# Patient Record
Sex: MALE | Race: BLACK OR AFRICAN AMERICAN | NOT HISPANIC OR LATINO | URBAN - METROPOLITAN AREA
[De-identification: names, ages, dates, MRNs, and addresses within clinical notes are randomized per-mention and may not be internally consistent; named-entity substitution may affect disease eponyms.]

---

## 2019-08-31 ENCOUNTER — INPATIENT (INPATIENT)
Facility: HOSPITAL | Age: 54
LOS: 1 days | Discharge: HOME | End: 2019-09-02

## 2019-08-31 VITALS
TEMPERATURE: 98 F | RESPIRATION RATE: 18 BRPM | HEART RATE: 79 BPM | OXYGEN SATURATION: 100 % | DIASTOLIC BLOOD PRESSURE: 83 MMHG | SYSTOLIC BLOOD PRESSURE: 122 MMHG

## 2019-08-31 DIAGNOSIS — K35.890 OTHER ACUTE APPENDICITIS WITHOUT PERFORATION OR GANGRENE: ICD-10-CM

## 2019-08-31 LAB
ALBUMIN SERPL ELPH-MCNC: 4.5 G/DL — SIGNIFICANT CHANGE UP (ref 3.3–5)
ALP SERPL-CCNC: 78 U/L — SIGNIFICANT CHANGE UP (ref 40–120)
ALT FLD-CCNC: 12 U/L — SIGNIFICANT CHANGE UP (ref 4–41)
ANION GAP SERPL CALC-SCNC: 12 MMO/L — SIGNIFICANT CHANGE UP (ref 7–14)
APPEARANCE UR: CLEAR — SIGNIFICANT CHANGE UP
APTT BLD: 27.8 SEC — SIGNIFICANT CHANGE UP (ref 27.5–36.3)
AST SERPL-CCNC: 14 U/L — SIGNIFICANT CHANGE UP (ref 4–40)
BASOPHILS # BLD AUTO: 0.07 K/UL — SIGNIFICANT CHANGE UP (ref 0–0.2)
BASOPHILS NFR BLD AUTO: 0.8 % — SIGNIFICANT CHANGE UP (ref 0–2)
BILIRUB SERPL-MCNC: 0.5 MG/DL — SIGNIFICANT CHANGE UP (ref 0.2–1.2)
BILIRUB UR-MCNC: NEGATIVE — SIGNIFICANT CHANGE UP
BLD GP AB SCN SERPL QL: NEGATIVE — SIGNIFICANT CHANGE UP
BLOOD UR QL VISUAL: NEGATIVE — SIGNIFICANT CHANGE UP
BUN SERPL-MCNC: 11 MG/DL — SIGNIFICANT CHANGE UP (ref 7–23)
CALCIUM SERPL-MCNC: 10.1 MG/DL — SIGNIFICANT CHANGE UP (ref 8.4–10.5)
CHLORIDE SERPL-SCNC: 101 MMOL/L — SIGNIFICANT CHANGE UP (ref 98–107)
CO2 SERPL-SCNC: 31 MMOL/L — SIGNIFICANT CHANGE UP (ref 22–31)
COLOR SPEC: SIGNIFICANT CHANGE UP
CREAT SERPL-MCNC: 0.92 MG/DL — SIGNIFICANT CHANGE UP (ref 0.5–1.3)
EOSINOPHIL # BLD AUTO: 0.11 K/UL — SIGNIFICANT CHANGE UP (ref 0–0.5)
EOSINOPHIL NFR BLD AUTO: 1.2 % — SIGNIFICANT CHANGE UP (ref 0–6)
GLUCOSE SERPL-MCNC: 94 MG/DL — SIGNIFICANT CHANGE UP (ref 70–99)
GLUCOSE UR-MCNC: NEGATIVE — SIGNIFICANT CHANGE UP
HCT VFR BLD CALC: 42.9 % — SIGNIFICANT CHANGE UP (ref 39–50)
HGB BLD-MCNC: 13.6 G/DL — SIGNIFICANT CHANGE UP (ref 13–17)
IMM GRANULOCYTES NFR BLD AUTO: 0.6 % — SIGNIFICANT CHANGE UP (ref 0–1.5)
INR BLD: 1.1 — SIGNIFICANT CHANGE UP (ref 0.88–1.17)
KETONES UR-MCNC: NEGATIVE — SIGNIFICANT CHANGE UP
LEUKOCYTE ESTERASE UR-ACNC: NEGATIVE — SIGNIFICANT CHANGE UP
LYMPHOCYTES # BLD AUTO: 3.28 K/UL — SIGNIFICANT CHANGE UP (ref 1–3.3)
LYMPHOCYTES # BLD AUTO: 36.5 % — SIGNIFICANT CHANGE UP (ref 13–44)
MCHC RBC-ENTMCNC: 30.4 PG — SIGNIFICANT CHANGE UP (ref 27–34)
MCHC RBC-ENTMCNC: 31.7 % — LOW (ref 32–36)
MCV RBC AUTO: 95.8 FL — SIGNIFICANT CHANGE UP (ref 80–100)
MONOCYTES # BLD AUTO: 0.57 K/UL — SIGNIFICANT CHANGE UP (ref 0–0.9)
MONOCYTES NFR BLD AUTO: 6.3 % — SIGNIFICANT CHANGE UP (ref 2–14)
NEUTROPHILS # BLD AUTO: 4.9 K/UL — SIGNIFICANT CHANGE UP (ref 1.8–7.4)
NEUTROPHILS NFR BLD AUTO: 54.6 % — SIGNIFICANT CHANGE UP (ref 43–77)
NITRITE UR-MCNC: NEGATIVE — SIGNIFICANT CHANGE UP
NRBC # FLD: 0 K/UL — SIGNIFICANT CHANGE UP (ref 0–0)
PH UR: 6 — SIGNIFICANT CHANGE UP (ref 5–8)
PLATELET # BLD AUTO: 310 K/UL — SIGNIFICANT CHANGE UP (ref 150–400)
PMV BLD: 9.4 FL — SIGNIFICANT CHANGE UP (ref 7–13)
POTASSIUM SERPL-MCNC: 4.8 MMOL/L — SIGNIFICANT CHANGE UP (ref 3.5–5.3)
POTASSIUM SERPL-SCNC: 4.8 MMOL/L — SIGNIFICANT CHANGE UP (ref 3.5–5.3)
PROT SERPL-MCNC: 8.6 G/DL — HIGH (ref 6–8.3)
PROT UR-MCNC: NEGATIVE — SIGNIFICANT CHANGE UP
PROTHROM AB SERPL-ACNC: 12.3 SEC — SIGNIFICANT CHANGE UP (ref 9.8–13.1)
RBC # BLD: 4.48 M/UL — SIGNIFICANT CHANGE UP (ref 4.2–5.8)
RBC # FLD: 11.5 % — SIGNIFICANT CHANGE UP (ref 10.3–14.5)
RH IG SCN BLD-IMP: POSITIVE — SIGNIFICANT CHANGE UP
SODIUM SERPL-SCNC: 144 MMOL/L — SIGNIFICANT CHANGE UP (ref 135–145)
SP GR SPEC: 1.03 — SIGNIFICANT CHANGE UP (ref 1–1.04)
UROBILINOGEN FLD QL: NORMAL — SIGNIFICANT CHANGE UP
WBC # BLD: 8.98 K/UL — SIGNIFICANT CHANGE UP (ref 3.8–10.5)
WBC # FLD AUTO: 8.98 K/UL — SIGNIFICANT CHANGE UP (ref 3.8–10.5)

## 2019-08-31 RX ORDER — SODIUM CHLORIDE 9 MG/ML
1000 INJECTION, SOLUTION INTRAVENOUS
Refills: 0 | Status: DISCONTINUED | OUTPATIENT
Start: 2019-08-31 | End: 2019-09-02

## 2019-08-31 RX ORDER — SODIUM CHLORIDE 9 MG/ML
1000 INJECTION INTRAMUSCULAR; INTRAVENOUS; SUBCUTANEOUS
Refills: 0 | Status: DISCONTINUED | OUTPATIENT
Start: 2019-08-31 | End: 2019-08-31

## 2019-08-31 RX ORDER — PIPERACILLIN AND TAZOBACTAM 4; .5 G/20ML; G/20ML
3.38 INJECTION, POWDER, LYOPHILIZED, FOR SOLUTION INTRAVENOUS EVERY 8 HOURS
Refills: 0 | Status: DISCONTINUED | OUTPATIENT
Start: 2019-08-31 | End: 2019-09-02

## 2019-08-31 RX ORDER — SODIUM CHLORIDE 9 MG/ML
1000 INJECTION INTRAMUSCULAR; INTRAVENOUS; SUBCUTANEOUS ONCE
Refills: 0 | Status: COMPLETED | OUTPATIENT
Start: 2019-08-31 | End: 2019-08-31

## 2019-08-31 RX ORDER — PIPERACILLIN AND TAZOBACTAM 4; .5 G/20ML; G/20ML
3.38 INJECTION, POWDER, LYOPHILIZED, FOR SOLUTION INTRAVENOUS ONCE
Refills: 0 | Status: COMPLETED | OUTPATIENT
Start: 2019-08-31 | End: 2019-08-31

## 2019-08-31 RX ORDER — ACETAMINOPHEN 500 MG
1000 TABLET ORAL ONCE
Refills: 0 | Status: COMPLETED | OUTPATIENT
Start: 2019-09-01 | End: 2019-09-01

## 2019-08-31 RX ORDER — ACETAMINOPHEN 500 MG
1000 TABLET ORAL ONCE
Refills: 0 | Status: COMPLETED | OUTPATIENT
Start: 2019-08-31 | End: 2019-08-31

## 2019-08-31 RX ORDER — ENOXAPARIN SODIUM 100 MG/ML
40 INJECTION SUBCUTANEOUS DAILY
Refills: 0 | Status: DISCONTINUED | OUTPATIENT
Start: 2019-08-31 | End: 2019-09-02

## 2019-08-31 RX ADMIN — SODIUM CHLORIDE 1000 MILLILITER(S): 9 INJECTION INTRAMUSCULAR; INTRAVENOUS; SUBCUTANEOUS at 20:55

## 2019-08-31 RX ADMIN — PIPERACILLIN AND TAZOBACTAM 200 GRAM(S): 4; .5 INJECTION, POWDER, LYOPHILIZED, FOR SOLUTION INTRAVENOUS at 20:55

## 2019-08-31 NOTE — H&P ADULT - NSHPLABSRESULTS_GEN_ALL_CORE
Labs:                        13.6   8.98  )-----------( 310      ( 31 Aug 2019 18:35 )             42.9     PT/INR - ( 31 Aug 2019 18:35 )   PT: 12.3 SEC;   INR: 1.10          PTT - ( 31 Aug 2019 18:35 )  PTT:27.8 SEC      144  |  101  |  11  ----------------------------<  94  4.8   |  31  |  0.92    Ca    10.1      31 Aug 2019 18:35    TPro  8.6<H>  /  Alb  4.5  /  TBili  0.5  /  DBili  x   /  AST  14  /  ALT  12  /  AlkPhos  78      Urinalysis Basic - ( 31 Aug 2019 19:01 )    Color: LIGHT YELLOW / Appearance: CLEAR / S.035 / pH: 6.0  Gluc: NEGATIVE / Ketone: NEGATIVE  / Bili: NEGATIVE / Urobili: NORMAL   Blood: NEGATIVE / Protein: NEGATIVE / Nitrite: NEGATIVE   Leuk Esterase: NEGATIVE / RBC: x / WBC x   Sq Epi: x / Non Sq Epi: x / Bacteria: x

## 2019-08-31 NOTE — ED CLERICAL - NS ED CLERK NOTE PRE-ARRIVAL INFORMATION; ADDITIONAL PRE-ARRIVAL INFORMATION
RLQ pain- sent for CT showing appendicitis.  Arriving from radiology.  No PMH.  If admitted to surgery - Dr Simon as per Dr Jimenez.

## 2019-08-31 NOTE — H&P ADULT - NSHPPHYSICALEXAM_GEN_ALL_CORE
Physical Exam  T(C): 36.6  HR: 79 (79 - 79)  BP: 122/83 (122/83 - 122/83)  RR: 18 (18 - 18)  SpO2: 100% (100% - 100%)  Tmax: T(C): , Max: 36.6 (08-31-19 @ 16:53)    General: well developed, well nourished, NAD  Neuro: alert and oriented, no focal deficits, moves all extremities spontaneously  HEENT: NCAT, EOMI, anicteric, mucosa moist  Respiratory: airway patent, respirations unlabored  CVS: regular rate and rhythm  Abdomen: soft, TTP in RLQ, nondistended  Extremities: no edema, sensation and movement grossly intact  Skin: warm, dry, appropriate color

## 2019-08-31 NOTE — ED ADULT NURSE REASSESSMENT NOTE - NS ED NURSE REASSESS COMMENT FT1
pt alert,oriented x3. reports abd pains starting 1 wk ago. denies n,v,problems urinating. ct scan to showing possible appendicitis,referredd to ed. denies pain at present. iv access,albs sent. pt informed to remain npo .

## 2019-08-31 NOTE — H&P ADULT - ASSESSMENT
55 y/o with no PMHx presenting with RLQ pain x1 week, with outpatient CT scan consistent with acute appendicitis     - admit to A team surgery / Dr. Becker  - NPO   - IVF  - IV abx   - encourage OOB   - DVT ppx     Discussed with attending Dr. Rosita SAWANT Team surgery [id number]

## 2019-08-31 NOTE — H&P ADULT - HISTORY OF PRESENT ILLNESS
53 y/o with no PMhx presenting with abdominal pain x1 week, since last Sunday. Patient reports that he had a dull ache a week ago that worsened through out the week. Patient went to Urgent Care on Tuesday and followed-up with his PMD who sent him for outpatient CT scan. Outpatient ED scan concerning for appendicitis. Patient reports last decrease PO intake over the last week. Reports some nausea no episodes of emesis. Patient reports he has had colonoscopy in the past, and per patient it was normal. Denies fevers, chills, SOB, chest pain, diarrhea/constipation, recent illness or travel.

## 2019-08-31 NOTE — ED PROVIDER NOTE - OBJECTIVE STATEMENT
53 y/o M w/ no pertinent PMH, presents to the ED c/o RLQ pain that started on Monday, worsening Tuesday, and got CT scan done today which showed appendicitis w/ 1CM appendix, raine appendiceal, and appendolith. No pain at this point. No fever, nausea, vomiting, or anorexia.

## 2019-08-31 NOTE — ED ADULT TRIAGE NOTE - CHIEF COMPLAINT QUOTE
pt sent to ED for appendicitis.  pt has been having RLQ pain x a few days.  had a CT scan out patient which shows appendicitis.

## 2019-09-01 LAB
ANION GAP SERPL CALC-SCNC: 12 MMO/L — SIGNIFICANT CHANGE UP (ref 7–14)
BUN SERPL-MCNC: 9 MG/DL — SIGNIFICANT CHANGE UP (ref 7–23)
CALCIUM SERPL-MCNC: 8.9 MG/DL — SIGNIFICANT CHANGE UP (ref 8.4–10.5)
CHLORIDE SERPL-SCNC: 105 MMOL/L — SIGNIFICANT CHANGE UP (ref 98–107)
CO2 SERPL-SCNC: 26 MMOL/L — SIGNIFICANT CHANGE UP (ref 22–31)
CREAT SERPL-MCNC: 0.88 MG/DL — SIGNIFICANT CHANGE UP (ref 0.5–1.3)
GLUCOSE SERPL-MCNC: 73 MG/DL — SIGNIFICANT CHANGE UP (ref 70–99)
HCT VFR BLD CALC: 38.4 % — LOW (ref 39–50)
HCV AB S/CO SERPL IA: 0.74 S/CO — SIGNIFICANT CHANGE UP (ref 0–0.99)
HCV AB SERPL-IMP: SIGNIFICANT CHANGE UP
HGB BLD-MCNC: 12.3 G/DL — LOW (ref 13–17)
MAGNESIUM SERPL-MCNC: 1.9 MG/DL — SIGNIFICANT CHANGE UP (ref 1.6–2.6)
MCHC RBC-ENTMCNC: 30.8 PG — SIGNIFICANT CHANGE UP (ref 27–34)
MCHC RBC-ENTMCNC: 32 % — SIGNIFICANT CHANGE UP (ref 32–36)
MCV RBC AUTO: 96 FL — SIGNIFICANT CHANGE UP (ref 80–100)
NRBC # FLD: 0 K/UL — SIGNIFICANT CHANGE UP (ref 0–0)
PHOSPHATE SERPL-MCNC: 3 MG/DL — SIGNIFICANT CHANGE UP (ref 2.5–4.5)
PLATELET # BLD AUTO: 280 K/UL — SIGNIFICANT CHANGE UP (ref 150–400)
PMV BLD: 9.5 FL — SIGNIFICANT CHANGE UP (ref 7–13)
POTASSIUM SERPL-MCNC: 4.1 MMOL/L — SIGNIFICANT CHANGE UP (ref 3.5–5.3)
POTASSIUM SERPL-SCNC: 4.1 MMOL/L — SIGNIFICANT CHANGE UP (ref 3.5–5.3)
RBC # BLD: 4 M/UL — LOW (ref 4.2–5.8)
RBC # FLD: 11.4 % — SIGNIFICANT CHANGE UP (ref 10.3–14.5)
RH IG SCN BLD-IMP: POSITIVE — SIGNIFICANT CHANGE UP
SODIUM SERPL-SCNC: 143 MMOL/L — SIGNIFICANT CHANGE UP (ref 135–145)
WBC # BLD: 6.43 K/UL — SIGNIFICANT CHANGE UP (ref 3.8–10.5)
WBC # FLD AUTO: 6.43 K/UL — SIGNIFICANT CHANGE UP (ref 3.8–10.5)

## 2019-09-01 RX ORDER — INFLUENZA VIRUS VACCINE 15; 15; 15; 15 UG/.5ML; UG/.5ML; UG/.5ML; UG/.5ML
0.5 SUSPENSION INTRAMUSCULAR ONCE
Refills: 0 | Status: COMPLETED | OUTPATIENT
Start: 2019-09-01 | End: 2019-09-01

## 2019-09-01 RX ADMIN — PIPERACILLIN AND TAZOBACTAM 25 GRAM(S): 4; .5 INJECTION, POWDER, LYOPHILIZED, FOR SOLUTION INTRAVENOUS at 13:28

## 2019-09-01 RX ADMIN — PIPERACILLIN AND TAZOBACTAM 25 GRAM(S): 4; .5 INJECTION, POWDER, LYOPHILIZED, FOR SOLUTION INTRAVENOUS at 21:52

## 2019-09-01 RX ADMIN — PIPERACILLIN AND TAZOBACTAM 25 GRAM(S): 4; .5 INJECTION, POWDER, LYOPHILIZED, FOR SOLUTION INTRAVENOUS at 05:37

## 2019-09-01 RX ADMIN — ENOXAPARIN SODIUM 40 MILLIGRAM(S): 100 INJECTION SUBCUTANEOUS at 13:28

## 2019-09-01 NOTE — PROGRESS NOTE ADULT - ATTENDING COMMENTS
Pt seen/examined. Hx consistent with missed appendicitis. Clinically asymptomatic.    - cont IV antibx  - start clears  - plan elective appendectomy if cont to improve

## 2019-09-01 NOTE — PROGRESS NOTE ADULT - ASSESSMENT
55 y/o with no PMHx presenting with RLQ pain x1 week, with outpatient CT scan consistent with acute appendicitis     Plan:   - Diet: NPO   - IVF: LR @100cc  - IV abx: Zosyn  - encourage OOB   - DVT ppx: Lovenox    A Team Surgery   [id number]

## 2019-09-01 NOTE — PROGRESS NOTE ADULT - SUBJECTIVE AND OBJECTIVE BOX
GENERAL SURGERY DAILY PROGRESS NOTE:    Interval: No acute events overnight    S: Patient seen and examined. Reports no abdominal pain at the moment. Denies fever, chills, SOB. . - N/V. +/+ F/BM. +OOB. NPO and his only complaint is he is hungry.    O:   Exam:  Gen: NAD. Well developed, alert and cooperative.   Resp: No addition work of breathing.   Card: RR. No peripheral edema or pallor.   Abd: No masses. Soft, ND, NT.   Ext: WWP. Able to move all extremities equally.  Neuro: AA&Ox3. No focal defects.    Vital Signs Last 24 Hrs  T(C): 36.8 (01 Sep 2019 05:36), Max: 36.8 (31 Aug 2019 21:01)  T(F): 98.3 (01 Sep 2019 05:36), Max: 98.3 (31 Aug 2019 23:35)  HR: 74 (01 Sep 2019 05:36) (70 - 79)  BP: 115/69 (01 Sep 2019 05:36) (109/69 - 125/77)  BP(mean): --  RR: 18 (01 Sep 2019 05:36) (18 - 18)  SpO2: 100% (01 Sep 2019 05:36) (99% - 100%)    I&O's Detail    31 Aug 2019 07:01  -  01 Sep 2019 07:00  --------------------------------------------------------  IN:    lactated ringers.: 600 mL  Total IN: 600 mL    OUT:    Voided: 400 mL  Total OUT: 400 mL    Total NET: 200 mL          Daily Height in cm: 167.6 (01 Sep 2019 06:46)    Daily     MEDICATIONS  (STANDING):  acetaminophen  IVPB .. 1000 milliGRAM(s) IV Intermittent once  acetaminophen  IVPB .. 1000 milliGRAM(s) IV Intermittent once  enoxaparin Injectable 40 milliGRAM(s) SubCutaneous daily  influenza   Vaccine 0.5 milliLiter(s) IntraMuscular once  lactated ringers. 1000 milliLiter(s) (100 mL/Hr) IV Continuous <Continuous>  piperacillin/tazobactam IVPB.. 3.375 Gram(s) IV Intermittent every 8 hours    MEDICATIONS  (PRN):      LABS:                        13.6   8.98  )-----------( 310      ( 31 Aug 2019 18:35 )             42.9         144  |  101  |  11  ----------------------------<  94  4.8   |  31  |  0.92    Ca    10.1      31 Aug 2019 18:35    TPro  8.6<H>  /  Alb  4.5  /  TBili  0.5  /  DBili  x   /  AST  14  /  ALT  12  /  AlkPhos  78      PT/INR - ( 31 Aug 2019 18:35 )   PT: 12.3 SEC;   INR: 1.10          PTT - ( 31 Aug 2019 18:35 )  PTT:27.8 SEC  Urinalysis Basic - ( 31 Aug 2019 19:01 )    Color: LIGHT YELLOW / Appearance: CLEAR / S.035 / pH: 6.0  Gluc: NEGATIVE / Ketone: NEGATIVE  / Bili: NEGATIVE / Urobili: NORMAL   Blood: NEGATIVE / Protein: NEGATIVE / Nitrite: NEGATIVE   Leuk Esterase: NEGATIVE / RBC: x / WBC x   Sq Epi: x / Non Sq Epi: x / Bacteria: x

## 2019-09-02 VITALS
TEMPERATURE: 99 F | SYSTOLIC BLOOD PRESSURE: 115 MMHG | OXYGEN SATURATION: 99 % | DIASTOLIC BLOOD PRESSURE: 72 MMHG | RESPIRATION RATE: 15 BRPM | HEART RATE: 68 BPM

## 2019-09-02 LAB
ANION GAP SERPL CALC-SCNC: 16 MMO/L — HIGH (ref 7–14)
BUN SERPL-MCNC: 11 MG/DL — SIGNIFICANT CHANGE UP (ref 7–23)
CALCIUM SERPL-MCNC: 9.2 MG/DL — SIGNIFICANT CHANGE UP (ref 8.4–10.5)
CHLORIDE SERPL-SCNC: 101 MMOL/L — SIGNIFICANT CHANGE UP (ref 98–107)
CO2 SERPL-SCNC: 23 MMOL/L — SIGNIFICANT CHANGE UP (ref 22–31)
CREAT SERPL-MCNC: 0.96 MG/DL — SIGNIFICANT CHANGE UP (ref 0.5–1.3)
GLUCOSE SERPL-MCNC: 53 MG/DL — LOW (ref 70–99)
HCT VFR BLD CALC: 39.4 % — SIGNIFICANT CHANGE UP (ref 39–50)
HGB BLD-MCNC: 12.5 G/DL — LOW (ref 13–17)
MAGNESIUM SERPL-MCNC: 1.7 MG/DL — SIGNIFICANT CHANGE UP (ref 1.6–2.6)
MCHC RBC-ENTMCNC: 30.3 PG — SIGNIFICANT CHANGE UP (ref 27–34)
MCHC RBC-ENTMCNC: 31.7 % — LOW (ref 32–36)
MCV RBC AUTO: 95.4 FL — SIGNIFICANT CHANGE UP (ref 80–100)
NRBC # FLD: 0 K/UL — SIGNIFICANT CHANGE UP (ref 0–0)
PHOSPHATE SERPL-MCNC: 3 MG/DL — SIGNIFICANT CHANGE UP (ref 2.5–4.5)
PLATELET # BLD AUTO: 295 K/UL — SIGNIFICANT CHANGE UP (ref 150–400)
PMV BLD: 10.2 FL — SIGNIFICANT CHANGE UP (ref 7–13)
POTASSIUM SERPL-MCNC: 3.9 MMOL/L — SIGNIFICANT CHANGE UP (ref 3.5–5.3)
POTASSIUM SERPL-SCNC: 3.9 MMOL/L — SIGNIFICANT CHANGE UP (ref 3.5–5.3)
RBC # BLD: 4.13 M/UL — LOW (ref 4.2–5.8)
RBC # FLD: 11.3 % — SIGNIFICANT CHANGE UP (ref 10.3–14.5)
SODIUM SERPL-SCNC: 140 MMOL/L — SIGNIFICANT CHANGE UP (ref 135–145)
WBC # BLD: 8.13 K/UL — SIGNIFICANT CHANGE UP (ref 3.8–10.5)
WBC # FLD AUTO: 8.13 K/UL — SIGNIFICANT CHANGE UP (ref 3.8–10.5)

## 2019-09-02 RX ORDER — ACETAMINOPHEN 500 MG
100 TABLET ORAL
Qty: 0 | Refills: 0 | DISCHARGE
Start: 2019-09-02

## 2019-09-02 RX ORDER — MAGNESIUM SULFATE 500 MG/ML
2 VIAL (ML) INJECTION ONCE
Refills: 0 | Status: COMPLETED | OUTPATIENT
Start: 2019-09-02 | End: 2019-09-02

## 2019-09-02 RX ORDER — POTASSIUM CHLORIDE 20 MEQ
20 PACKET (EA) ORAL ONCE
Refills: 0 | Status: COMPLETED | OUTPATIENT
Start: 2019-09-02 | End: 2019-09-02

## 2019-09-02 RX ADMIN — Medication 50 GRAM(S): at 09:36

## 2019-09-02 RX ADMIN — PIPERACILLIN AND TAZOBACTAM 25 GRAM(S): 4; .5 INJECTION, POWDER, LYOPHILIZED, FOR SOLUTION INTRAVENOUS at 14:16

## 2019-09-02 RX ADMIN — Medication 20 MILLIEQUIVALENT(S): at 09:36

## 2019-09-02 RX ADMIN — PIPERACILLIN AND TAZOBACTAM 25 GRAM(S): 4; .5 INJECTION, POWDER, LYOPHILIZED, FOR SOLUTION INTRAVENOUS at 05:15

## 2019-09-02 NOTE — PROGRESS NOTE ADULT - SUBJECTIVE AND OBJECTIVE BOX
Interval Events:    No acute events overnight    S: Patient doing well, denies fevers, chills, nausea, emesis, chest pain, SOB.  Pain is well controlled. Tolerating PO w/o N/V.  +/+ F/BM.    O: Vital Signs Last 24 Hrs  T(C): 36.8 (02 Sep 2019 05:14), Max: 37.2 (01 Sep 2019 13:29)  T(F): 98.2 (02 Sep 2019 05:14), Max: 99 (01 Sep 2019 13:29)  HR: 72 (02 Sep 2019 05:14) (53 - 77)  BP: 104/60 (02 Sep 2019 05:14) (94/63 - 123/72)  BP(mean): --  RR: 16 (02 Sep 2019 05:14) (16 - 17)  SpO2: 100% (02 Sep 2019 05:14) (100% - 100%)      31 Aug 2019 07:01  -  01 Sep 2019 07:00  --------------------------------------------------------  IN:    lactated ringers.: 600 mL  Total IN: 600 mL    OUT:    Voided: 400 mL  Total OUT: 400 mL    Total NET: 200 mL      01 Sep 2019 07:01  -  02 Sep 2019 06:39  --------------------------------------------------------  IN:    IV PiggyBack: 200 mL    lactated ringers.: 1650 mL  Total IN: 1850 mL    OUT:    Voided: 1900 mL  Total OUT: 1900 mL    Total NET: -50 mL          Physical Exam:    Gen: Well-developed, well-nourished in no acute distress  Resp: Clear to auscultation bilaterally with no wheezes, rale, or rhonchi  CV: Regular rate and rhythm with no murmur, gallop, or rub  GI: Soft, non-tender, non-distended with normoactive bowel sounds.  No masses.  MSK: Moves all extremities equally  Skin: No rashes    Labs:                        12.3   6.43  )-----------( 280      ( 01 Sep 2019 06:30 )             38.4     01 Sep 2019 06:30    143    |  105    |  9      ----------------------------<  73     4.1     |  26     |  0.88     Ca    8.9        01 Sep 2019 06:30  Phos  3.0       01 Sep 2019 06:30  Mg     1.9       01 Sep 2019 06:30    TPro  8.6    /  Alb  4.5    /  TBili  0.5    /  DBili  x      /  AST  14     /  ALT  12     /  AlkPhos  78     31 Aug 2019 18:35    PT/INR - ( 31 Aug 2019 18:35 )   PT: 12.3 SEC;   INR: 1.10          PTT - ( 31 Aug 2019 18:35 )  PTT:27.8 SEC  CAPILLARY BLOOD GLUCOSE            LIVER FUNCTIONS - ( 31 Aug 2019 18:35 )  Alb: 4.5 g/dL / Pro: 8.6 g/dL / ALK PHOS: 78 u/L / ALT: 12 u/L / AST: 14 u/L / GGT: x             Urinalysis Basic - ( 31 Aug 2019 19:01 )    Color: LIGHT YELLOW / Appearance: CLEAR / S.035 / pH: 6.0  Gluc: NEGATIVE / Ketone: NEGATIVE  / Bili: NEGATIVE / Urobili: NORMAL   Blood: NEGATIVE / Protein: NEGATIVE / Nitrite: NEGATIVE   Leuk Esterase: NEGATIVE / RBC: x / WBC x   Sq Epi: x / Non Sq Epi: x / Bacteria: x        MEDICATIONS  (STANDING):  acetaminophen  IVPB .. 1000 milliGRAM(s) IV Intermittent once  enoxaparin Injectable 40 milliGRAM(s) SubCutaneous daily  influenza   Vaccine 0.5 milliLiter(s) IntraMuscular once  lactated ringers. 1000 milliLiter(s) (75 mL/Hr) IV Continuous <Continuous>  piperacillin/tazobactam IVPB.. 3.375 Gram(s) IV Intermittent every 8 hours    MEDICATIONS  (PRN):

## 2019-09-02 NOTE — DISCHARGE NOTE PROVIDER - NSDCCPCAREPLAN_GEN_ALL_CORE_FT
PRINCIPAL DISCHARGE DIAGNOSIS  Diagnosis: Other acute appendicitis  Assessment and Plan of Treatment:

## 2019-09-02 NOTE — DISCHARGE NOTE PROVIDER - HOSPITAL COURSE
Mr. Villegas is a 54yom with no PMH who presented with 1 week history of abdominal pain on 8/31. Outpatient CT scan was consistent with acute appendicitis.  He was made NPO and started on IV antibiotics.  His pain subsequently improved.  On 9/1 his pain improved, and he was almost entirely non-tender on exam; he was started on a clear liquid diet.  On 9/2 he tolerated a diet and he subjectively felt his pain had improved even more.  It was decided to discharge the patient on a 10 day course of augmentin, with follow-up for an appendectomy later in September.

## 2019-09-02 NOTE — PROGRESS NOTE ADULT - ASSESSMENT
53 y/o with no PMHx presenting with RLQ pain x1 week, with outpatient CT scan consistent with acute appendicitis     Plan:   - Diet:  regular  - Zosyn  - encourage OOB   - DVT ppx: Lovenox    Will begin dispo planning    A Team Surgery   [id number]

## 2019-09-02 NOTE — DISCHARGE NOTE NURSING/CASE MANAGEMENT/SOCIAL WORK - PATIENT PORTAL LINK FT
You can access the FollowMyHealth Patient Portal offered by [facility] by registering at the following website: [email or web address]. By joining [employer]’s FollowMyHealth portal, you will also be able to view your health information using other applications (apps) compatible with our system.

## 2019-09-02 NOTE — DISCHARGE NOTE PROVIDER - CARE PROVIDER_API CALL
Isidra Becker (MD)  ColonRectal Surgery; Surgery  [address] [facility] [address], [address]  [place] [address]  Phone: [phone]  Fax: [phone]  Follow Up Time:

## 2019-10-07 ENCOUNTER — OUTPATIENT (OUTPATIENT)
Dept: OUTPATIENT SERVICES | Facility: HOSPITAL | Age: 54
LOS: 1 days | End: 2019-10-07
Payer: COMMERCIAL

## 2019-10-07 VITALS
HEIGHT: 65.75 IN | HEART RATE: 80 BPM | DIASTOLIC BLOOD PRESSURE: 60 MMHG | RESPIRATION RATE: 14 BRPM | TEMPERATURE: 98 F | SYSTOLIC BLOOD PRESSURE: 112 MMHG | WEIGHT: 126.99 LBS

## 2019-10-07 DIAGNOSIS — K36 OTHER APPENDICITIS: ICD-10-CM

## 2019-10-07 DIAGNOSIS — K37 UNSPECIFIED APPENDICITIS: ICD-10-CM

## 2019-10-07 LAB
ANION GAP SERPL CALC-SCNC: 13 MMO/L — SIGNIFICANT CHANGE UP (ref 7–14)
BUN SERPL-MCNC: 16 MG/DL — SIGNIFICANT CHANGE UP (ref 7–23)
CALCIUM SERPL-MCNC: 9.7 MG/DL — SIGNIFICANT CHANGE UP (ref 8.4–10.5)
CHLORIDE SERPL-SCNC: 102 MMOL/L — SIGNIFICANT CHANGE UP (ref 98–107)
CO2 SERPL-SCNC: 27 MMOL/L — SIGNIFICANT CHANGE UP (ref 22–31)
CREAT SERPL-MCNC: 0.79 MG/DL — SIGNIFICANT CHANGE UP (ref 0.5–1.3)
GLUCOSE SERPL-MCNC: 77 MG/DL — SIGNIFICANT CHANGE UP (ref 70–99)
HCT VFR BLD CALC: 45 % — SIGNIFICANT CHANGE UP (ref 39–50)
HGB BLD-MCNC: 13.8 G/DL — SIGNIFICANT CHANGE UP (ref 13–17)
HIV COMBO RESULT: SIGNIFICANT CHANGE UP
HIV1+2 AB SPEC QL: SIGNIFICANT CHANGE UP
MCHC RBC-ENTMCNC: 30 PG — SIGNIFICANT CHANGE UP (ref 27–34)
MCHC RBC-ENTMCNC: 30.7 % — LOW (ref 32–36)
MCV RBC AUTO: 97.8 FL — SIGNIFICANT CHANGE UP (ref 80–100)
NRBC # FLD: 0 K/UL — SIGNIFICANT CHANGE UP (ref 0–0)
PLATELET # BLD AUTO: 200 K/UL — SIGNIFICANT CHANGE UP (ref 150–400)
PMV BLD: 11.2 FL — SIGNIFICANT CHANGE UP (ref 7–13)
POTASSIUM SERPL-MCNC: 3.7 MMOL/L — SIGNIFICANT CHANGE UP (ref 3.5–5.3)
POTASSIUM SERPL-SCNC: 3.7 MMOL/L — SIGNIFICANT CHANGE UP (ref 3.5–5.3)
RBC # BLD: 4.6 M/UL — SIGNIFICANT CHANGE UP (ref 4.2–5.8)
RBC # FLD: 12.2 % — SIGNIFICANT CHANGE UP (ref 10.3–14.5)
SODIUM SERPL-SCNC: 142 MMOL/L — SIGNIFICANT CHANGE UP (ref 135–145)
WBC # BLD: 6.99 K/UL — SIGNIFICANT CHANGE UP (ref 3.8–10.5)
WBC # FLD AUTO: 6.99 K/UL — SIGNIFICANT CHANGE UP (ref 3.8–10.5)

## 2019-10-07 PROCEDURE — 93010 ELECTROCARDIOGRAM REPORT: CPT

## 2019-10-07 NOTE — H&P PST ADULT - NSICDXPROBLEM_GEN_ALL_CORE_FT
PROBLEM DIAGNOSES  Problem: Appendicitis  Assessment and Plan: Pt. is scheduled for a laparoscopic appendectomy 10/14/19.  Pt. verbalized understanding of instructions as discussed and outlined on the instruction sheets.  Pt. did teach back on Chlorhexidine wash.

## 2019-10-14 ENCOUNTER — OUTPATIENT (OUTPATIENT)
Dept: OUTPATIENT SERVICES | Facility: HOSPITAL | Age: 54
LOS: 1 days | Discharge: HOME | End: 2019-10-14
Payer: COMMERCIAL

## 2019-10-14 VITALS
TEMPERATURE: 99 F | RESPIRATION RATE: 14 BRPM | DIASTOLIC BLOOD PRESSURE: 68 MMHG | OXYGEN SATURATION: 96 % | HEART RATE: 79 BPM | SYSTOLIC BLOOD PRESSURE: 111 MMHG

## 2019-10-14 VITALS
SYSTOLIC BLOOD PRESSURE: 118 MMHG | HEART RATE: 87 BPM | WEIGHT: 126.99 LBS | RESPIRATION RATE: 15 BRPM | OXYGEN SATURATION: 100 % | TEMPERATURE: 98 F | HEIGHT: 65.75 IN | DIASTOLIC BLOOD PRESSURE: 76 MMHG

## 2019-10-14 DIAGNOSIS — K36 OTHER APPENDICITIS: ICD-10-CM

## 2019-10-14 PROCEDURE — 88304 TISSUE EXAM BY PATHOLOGIST: CPT | Mod: 26

## 2019-10-14 RX ORDER — L.ACIDOPH/B.ANIMALIS/B.LONGUM 15B CELL
1 CAPSULE ORAL
Qty: 0 | Refills: 0 | DISCHARGE

## 2019-10-14 RX ORDER — FENTANYL CITRATE 50 UG/ML
50 INJECTION INTRAVENOUS ONCE
Refills: 0 | Status: DISCONTINUED | OUTPATIENT
Start: 2019-10-14 | End: 2019-10-14

## 2019-10-14 RX ORDER — SODIUM CHLORIDE 9 MG/ML
1000 INJECTION, SOLUTION INTRAVENOUS
Refills: 0 | Status: DISCONTINUED | OUTPATIENT
Start: 2019-10-14 | End: 2019-10-14

## 2019-10-14 RX ORDER — SODIUM CHLORIDE 9 MG/ML
1000 INJECTION, SOLUTION INTRAVENOUS
Refills: 0 | Status: DISCONTINUED | OUTPATIENT
Start: 2019-10-14 | End: 2019-11-17

## 2019-10-14 RX ORDER — OXYCODONE HYDROCHLORIDE 5 MG/1
1 TABLET ORAL
Qty: 15 | Refills: 0
Start: 2019-10-14

## 2019-10-14 RX ORDER — FENTANYL CITRATE 50 UG/ML
25 INJECTION INTRAVENOUS
Refills: 0 | Status: DISCONTINUED | OUTPATIENT
Start: 2019-10-14 | End: 2019-10-14

## 2019-10-14 RX ORDER — ONDANSETRON 8 MG/1
4 TABLET, FILM COATED ORAL ONCE
Refills: 0 | Status: DISCONTINUED | OUTPATIENT
Start: 2019-10-14 | End: 2019-11-17

## 2019-10-14 RX ORDER — CHOLECALCIFEROL (VITAMIN D3) 125 MCG
4 CAPSULE ORAL
Qty: 0 | Refills: 0 | DISCHARGE

## 2019-10-14 NOTE — ASU DISCHARGE PLAN (ADULT/PEDIATRIC) - ASU DC SPECIAL INSTRUCTIONSFT
There are small white bandaids over your incisions. They will either fall off on their own or be taken off in the office. Please allow water and soap to run over your incisions and pat them dry. Do not scrub the incisions.     Follow up in 1-2 weeks with Dr. Becker.

## 2019-10-14 NOTE — ASU DISCHARGE PLAN (ADULT/PEDIATRIC) - CALL YOUR DOCTOR IF YOU HAVE ANY OF THE FOLLOWING:
Bleeding that does not stop/Excessive diarrhea/Inability to tolerate liquids or foods/Wound/Surgical Site with redness, or foul smelling discharge or pus/Nausea and vomiting that does not stop/Unable to urinate Inability to tolerate liquids or foods/Wound/Surgical Site with redness, or foul smelling discharge or pus/Excessive diarrhea/Fever greater than (need to indicate Fahrenheit or Celsius)/Nausea and vomiting that does not stop/Unable to urinate/Bleeding that does not stop

## 2019-10-14 NOTE — ASU DISCHARGE PLAN (ADULT/PEDIATRIC) - CARE PROVIDER_API CALL
Isidra Becker (MD)  ColonRectal Surgery; Surgery  [address] [facility] [address]  [place] [address]  Phone: [phone]  Fax: [phone]  Follow Up Time:

## 2019-10-14 NOTE — ASU DISCHARGE PLAN (ADULT/PEDIATRIC) - NURSING INSTRUCTIONS
You were given 1000mg IV Tylenol for pain management.  Please DO NOT take any Tylenol containing products, such as  Vicodin, Percocet, Excedrin, many cold preparations for the next 6 hours (until 215p).  DO NOT EXCEED 3000MG OF TYLENOL OVER 24 HOURS.   You were given Toradol for pain management. Please DO Not take Motrin/Ibuprofen/Advil/Aleve (NSAIDS) for the next 6 hours (Until 230p)

## 2019-10-17 LAB — SURGICAL PATHOLOGY STUDY: SIGNIFICANT CHANGE UP

## 2021-01-27 NOTE — BRIEF OPERATIVE NOTE - SPECIMENS
· Continue hydroxychloroquine 200 mg.  Take 1 tablet twice daily.  · Increase methotrexate 2.5 mg.  Take8 tablets once a week.   · Continue folic acid 1 mg.  Take 1 tablet daily  · Start omeprazole for stomach protection.    Blood tests (CBC, AST, ALT , ESR) in 1 month, then every 3 months to monitor for bone marrow and liver toxicity due to methotrexate    Methotrexate can increase the risk of infections. One should call us if any infection develops.  Stop using methotrexate if you have signs if infection such as fever, chills, sore throat, flu like symptoms, sores or white patches in your mouth, night sweats, stomach pain, diarrhea, weight loss, skin redness and swelling, cough or chest pain.   Methotrexate may increase cancer risk which may include blood disorders like leukemia. Follow up with primary care doctor for cancer monitoring.  Methotrexate can cause inflammation of the lungs.   All patients taking methotrexate should be taking a daily folic acid.  Avoid alcohol.    Please be aware that this is not a complete list of risks. We encourage all patients to read package inserts, information from the [employer], and other educational material about the drug that is being taken.      Flu vaccine every year.  Pneumonia vaccine if not already given.  Follow up with PCP for assessment of cardiovascular risk factors as rheumatoid arthritis is associated with increased risk of cardiovascular disease.     Follow up on 3/17/21 at 9 AM.   appendix

## 2022-04-10 NOTE — H&P PST ADULT - GUM GEN PE MLT EXAM PC
Progress Note( Dr. Tavon Burns)  4/10/2022  Subjective:   Admit Date: 3/30/2022  PCP: Fern Canseco MD    Admitted For : Patient was initially admitted to medical floor for sepsis from UTI and generalized  weakness    Consulted For: Better control of blood glucose    Interval History: Patient was transferred from medical floor on 3/30/2022 and was admitted to acute rehab unit for rehabilitation and physical therapy  Blood sugar control is better    Patient seem to be more awake and alert  Somewhat  seem to be confused    Denies any chest pains,   Denies SOB . Denies nausea or vomiting. No new bowel or bladder symptoms.        Intake/Output Summary (Last 24 hours) at 4/10/2022 1241  Last data filed at 4/10/2022 9012  Gross per 24 hour   Intake 340 ml   Output 1600 ml   Net -1260 ml       DATA    CBC:   Recent Labs     04/09/22  2251   WBC 10.2   HGB 9.5*       CMP:  Recent Labs     04/09/22  2251      K 4.8      CO2 22   BUN 84*   CREATININE 2.7*   CALCIUM 9.5   LABALBU 3.6     Lipids: No results found for: CHOL, HDL, TRIG  Glucose:  Recent Labs     04/09/22  1940 04/10/22  0733 04/10/22  1142   POCGLU 231* 171* 231*     [id number]:  Lab Results   Component Value Date    LABA1C 9.2 03/22/2022     High Sensitivity TSH:   Lab Results   Component Value Date    TSHHS 6.190 03/25/2022     Free T3: No results found for: FT3  Free T4:  Lab Results   Component Value Date    T4FREE 1.15 03/27/2022       XR CHEST PORTABLE    (Results Pending)        Scheduled Medicines   Medications:    insulin glargine  10 Units SubCUTAneous QAM    glipiZIDE  10 mg Oral BID AC    fluconazole  100 mg Oral Daily    insulin lispro  0-9 Units SubCUTAneous Nightly    sodium bicarbonate  650 mg Oral TID    insulin lispro  0-18 Units SubCUTAneous TID WC    miconazole   Topical BID    heparin (porcine)  5,000 Units SubCUTAneous 3 times per day    sodium chloride flush  5-40 mL IntraVENous 2 times per day    allopurinol  100 mg Oral Daily    alogliptin  6.25 mg Oral Daily    aspirin  81 mg Oral Daily    atorvastatin  40 mg Oral Nightly    carvedilol  3.125 mg Oral BID WC    levothyroxine  50 mcg Oral Daily    pregabalin  50 mg Oral BID      Infusions:    sodium chloride 100 mL/hr at 04/10/22 0743    dextrose           Objective:   Vitals: /62   Pulse 83   Temp 98.2 °F (36.8 °C) (Oral)   Resp 16   Ht 5' 8\" (1.727 m)   Wt 202 lb 2.6 oz (91.7 kg)   SpO2 98%   BMI 30.74 kg/m²   General appearance: alert and cooperative with exam  Neck: no JVD or bruit  Thyroid : Normal lobes   Lungs: Has Vesicular Breath sounds   Heart:  regular rate and rhythm  Abdomen: soft, non-tender; bowel sounds normal; no masses,  no organomegaly has Surapubic Catheter   Musculoskeletal: Normal  Extremities: extremities normal, , no edema  Neurologic:  Awake, alert, oriented to name, place and time. Cranial nerves II-XII are grossly intact. Motor weakness . Sensory is intact. ,  and gait is abnormal.unstable     Assessment:     Patient Active Problem List:     Gait disturbance     Generalized weakness     Diabetes mellitus (HCC)     Osteoarthritis     Anemia of chronic disorder     Infected implanted bladder sphincter cuff     Escherichia coli urinary tract infection     Hypertension     Sepsis ([address]     Type 2 diabetes mellitus with hypoglycemia without coma ([address]     UTI (urinary tract infection) due to urinary indwelling catheter (HCC)     Hyperkalemia     Acute kidney failure (HCC)     Leg weakness, bilateral     Type 2 diabetes mellitus with neurological manifestations, uncontrolled ([address]     Complicated UTI (urinary tract infection)     Essential hypertension     Severe muscle deconditioning     Dyslipidemia due to type 2 diabetes mellitus (HCC)     Frequent falls     Chronic kidney disease, stage 3a ([address]     Uncontrolled type 2 diabetes mellitus with peripheral neuropathy (HCC)     Prostate cancer (HCC)     Suprapubic catheter ([facility] [facility]     Sepsis secondary to UTI [facility])      Plan:     1. Reviewed POC blood glucose . Labs and X ray results   2. Reviewed Current Medicines   3. On m Correction bolus Humalog/ Basal Lantus Insulin. 4. Monitor Blood glucose frequently   5. Modified  the dose of Insulin/ other medicines as needed  6. On low-dose Synthroid of 50 mg/day  7. Patient was transferred to acute rehab unit on 3/30//2022   8. Patient to be participating in physical therapy and Occupational Therapy of acute rehab unit  9. Will follow     .      Deejay Cruz MD, MD detailed exam

## 2024-05-20 NOTE — ED PROVIDER NOTE - CPE EDP EYES NORM
Follow-up with podiatry for further care. Contact info provided below if needed.  Use over the counter medications as stated on the bottle as needed for pain control.  Use surgical shoe to offload pressure from the painful area.  Use crutches as needed.   Return to the ED with new or worsening symptoms.      normal...